# Patient Record
Sex: FEMALE | Race: WHITE | HISPANIC OR LATINO | Employment: UNEMPLOYED | URBAN - METROPOLITAN AREA
[De-identification: names, ages, dates, MRNs, and addresses within clinical notes are randomized per-mention and may not be internally consistent; named-entity substitution may affect disease eponyms.]

---

## 2024-02-14 ENCOUNTER — LAB VISIT (OUTPATIENT)
Dept: LAB | Facility: HOSPITAL | Age: 29
End: 2024-02-14
Attending: OBSTETRICS & GYNECOLOGY
Payer: MEDICAID

## 2024-02-14 DIAGNOSIS — Z34.82 ENCOUNTER FOR SUPERVISION OF NORMAL PREGNANCY IN MULTIGRAVIDA IN SECOND TRIMESTER: ICD-10-CM

## 2024-02-14 LAB — GLUCOSE 1H P 100 G GLC PO SERPL-MCNC: 145 MG/DL (ref 100–180)

## 2024-02-14 PROCEDURE — 36415 COLL VENOUS BLD VENIPUNCTURE: CPT

## 2024-02-14 PROCEDURE — 82950 GLUCOSE TEST: CPT

## 2024-02-16 ENCOUNTER — LAB VISIT (OUTPATIENT)
Dept: LAB | Facility: HOSPITAL | Age: 29
End: 2024-02-16
Attending: OBSTETRICS & GYNECOLOGY
Payer: MEDICAID

## 2024-02-16 DIAGNOSIS — Z34.83 ENCOUNTER FOR SUPERVISION OF NORMAL PREGNANCY IN MULTIGRAVIDA IN THIRD TRIMESTER: ICD-10-CM

## 2024-02-16 DIAGNOSIS — R73.09 ELEVATED GLUCOSE TOLERANCE TEST: ICD-10-CM

## 2024-02-16 LAB
GLUCOSE 1H P 100 G GLC PO SERPL-MCNC: 125 MG/DL (ref 100–180)
GLUCOSE 2H P 100 G GLC PO SERPL-MCNC: 123 MG/DL (ref 70–140)
GLUCOSE 3H P 100 G GLC PO SERPL-MCNC: 123 MG/DL (ref 70–115)
GLUCOSE P FAST SERPL-MCNC: 81 MG/DL (ref 70–100)
PATH REV: NORMAL

## 2024-02-16 PROCEDURE — 82947 ASSAY GLUCOSE BLOOD QUANT: CPT

## 2024-02-16 PROCEDURE — 82951 GLUCOSE TOLERANCE TEST (GTT): CPT

## 2024-02-16 PROCEDURE — 36415 COLL VENOUS BLD VENIPUNCTURE: CPT

## 2024-02-16 PROCEDURE — 82952 GTT-ADDED SAMPLES: CPT

## 2024-02-16 PROCEDURE — 82950 GLUCOSE TEST: CPT

## 2024-02-19 LAB — POCT GLUCOSE: 75 MG/DL (ref 70–110)

## 2024-02-28 ENCOUNTER — LAB VISIT (OUTPATIENT)
Dept: LAB | Facility: HOSPITAL | Age: 29
End: 2024-02-28
Attending: OBSTETRICS & GYNECOLOGY
Payer: MEDICAID

## 2024-02-28 DIAGNOSIS — Z34.83 ENCOUNTER FOR SUPERVISION OF NORMAL PREGNANCY IN MULTIGRAVIDA IN THIRD TRIMESTER: ICD-10-CM

## 2024-02-28 LAB
BASOPHILS # BLD AUTO: 0.03 X10(3)/MCL
BASOPHILS NFR BLD AUTO: 0.3 %
C TRACH DNA SPEC QL NAA+PROBE: NEGATIVE
EOSINOPHIL # BLD AUTO: 0.11 X10(3)/MCL (ref 0–0.9)
EOSINOPHIL NFR BLD AUTO: 1 %
ERYTHROCYTE [DISTWIDTH] IN BLOOD BY AUTOMATED COUNT: 14.6 % (ref 11.5–17)
HCT VFR BLD AUTO: 41.7 % (ref 37–47)
HGB BLD-MCNC: 13.3 G/DL (ref 12–16)
HIV 1+2 AB+HIV1 P24 AG SERPL QL IA: NONREACTIVE
IMM GRANULOCYTES # BLD AUTO: 0.12 X10(3)/MCL (ref 0–0.04)
IMM GRANULOCYTES NFR BLD AUTO: 1.1 %
LYMPHOCYTES # BLD AUTO: 1.51 X10(3)/MCL (ref 0.6–4.6)
LYMPHOCYTES NFR BLD AUTO: 14.2 %
MCH RBC QN AUTO: 28.7 PG (ref 27–31)
MCHC RBC AUTO-ENTMCNC: 31.9 G/DL (ref 33–36)
MCV RBC AUTO: 90.1 FL (ref 80–94)
MONOCYTES # BLD AUTO: 0.79 X10(3)/MCL (ref 0.1–1.3)
MONOCYTES NFR BLD AUTO: 7.4 %
N GONORRHOEA DNA SPEC QL NAA+PROBE: NEGATIVE
NEUTROPHILS # BLD AUTO: 8.08 X10(3)/MCL (ref 2.1–9.2)
NEUTROPHILS NFR BLD AUTO: 76 %
NRBC BLD AUTO-RTO: 0 %
PLATELET # BLD AUTO: 299 X10(3)/MCL (ref 130–400)
PMV BLD AUTO: 9.9 FL (ref 7.4–10.4)
PRENATAL STREP B CULTURE: NEGATIVE
RBC # BLD AUTO: 4.63 X10(6)/MCL (ref 4.2–5.4)
T PALLIDUM AB SER QL: NONREACTIVE
WBC # SPEC AUTO: 10.64 X10(3)/MCL (ref 4.5–11.5)

## 2024-02-28 PROCEDURE — 85025 COMPLETE CBC W/AUTO DIFF WBC: CPT

## 2024-02-28 PROCEDURE — 86780 TREPONEMA PALLIDUM: CPT

## 2024-02-28 PROCEDURE — 87389 HIV-1 AG W/HIV-1&-2 AB AG IA: CPT

## 2024-02-28 PROCEDURE — 36415 COLL VENOUS BLD VENIPUNCTURE: CPT

## 2024-03-18 ENCOUNTER — ANESTHESIA (OUTPATIENT)
Dept: OBSTETRICS AND GYNECOLOGY | Facility: HOSPITAL | Age: 29
End: 2024-03-18
Payer: MEDICAID

## 2024-03-18 ENCOUNTER — HOSPITAL ENCOUNTER (INPATIENT)
Facility: HOSPITAL | Age: 29
LOS: 3 days | Discharge: HOME | End: 2024-03-21
Attending: OBSTETRICS & GYNECOLOGY | Admitting: OBSTETRICS & GYNECOLOGY
Payer: MEDICAID

## 2024-03-18 ENCOUNTER — ANESTHESIA EVENT (OUTPATIENT)
Dept: OBSTETRICS AND GYNECOLOGY | Facility: HOSPITAL | Age: 29
End: 2024-03-18
Payer: MEDICAID

## 2024-03-18 DIAGNOSIS — R10.9 ABDOMINAL PAIN AFFECTING PREGNANCY: ICD-10-CM

## 2024-03-18 DIAGNOSIS — Z3A.38 38 WEEKS GESTATION OF PREGNANCY: Primary | ICD-10-CM

## 2024-03-18 DIAGNOSIS — O26.899 ABDOMINAL PAIN AFFECTING PREGNANCY: ICD-10-CM

## 2024-03-18 LAB
BASOPHILS # BLD AUTO: 0.03 X10(3)/MCL
BASOPHILS NFR BLD AUTO: 0.2 %
EOSINOPHIL # BLD AUTO: 0.09 X10(3)/MCL (ref 0–0.9)
EOSINOPHIL NFR BLD AUTO: 0.7 %
ERYTHROCYTE [DISTWIDTH] IN BLOOD BY AUTOMATED COUNT: 14.9 % (ref 11.5–17)
GROUP & RH: NORMAL
HCT VFR BLD AUTO: 37.3 % (ref 37–47)
HGB BLD-MCNC: 12.4 G/DL (ref 12–16)
IMM GRANULOCYTES # BLD AUTO: 0.05 X10(3)/MCL (ref 0–0.04)
IMM GRANULOCYTES NFR BLD AUTO: 0.4 %
INDIRECT COOMBS: NORMAL
LYMPHOCYTES # BLD AUTO: 2.51 X10(3)/MCL (ref 0.6–4.6)
LYMPHOCYTES NFR BLD AUTO: 19.5 %
MCH RBC QN AUTO: 29 PG (ref 27–31)
MCHC RBC AUTO-ENTMCNC: 33.2 G/DL (ref 33–36)
MCV RBC AUTO: 87.1 FL (ref 80–94)
MONOCYTES # BLD AUTO: 0.73 X10(3)/MCL (ref 0.1–1.3)
MONOCYTES NFR BLD AUTO: 5.7 %
NEUTROPHILS # BLD AUTO: 9.44 X10(3)/MCL (ref 2.1–9.2)
NEUTROPHILS NFR BLD AUTO: 73.5 %
NRBC BLD AUTO-RTO: 0 %
PLATELET # BLD AUTO: 268 X10(3)/MCL (ref 130–400)
PMV BLD AUTO: 10.8 FL (ref 7.4–10.4)
RBC # BLD AUTO: 4.28 X10(6)/MCL (ref 4.2–5.4)
SPECIMEN OUTDATE: NORMAL
T PALLIDUM AB SER QL: NONREACTIVE
WBC # SPEC AUTO: 12.85 X10(3)/MCL (ref 4.5–11.5)

## 2024-03-18 PROCEDURE — 99900059 HC C-SECTION ATTEND (STAT)

## 2024-03-18 PROCEDURE — 63600175 PHARM REV CODE 636 W HCPCS: Performed by: STUDENT IN AN ORGANIZED HEALTH CARE EDUCATION/TRAINING PROGRAM

## 2024-03-18 PROCEDURE — 10907ZC DRAINAGE OF AMNIOTIC FLUID, THERAPEUTIC FROM PRODUCTS OF CONCEPTION, VIA NATURAL OR ARTIFICIAL OPENING: ICD-10-PCS | Performed by: OBSTETRICS & GYNECOLOGY

## 2024-03-18 PROCEDURE — 63600175 PHARM REV CODE 636 W HCPCS: Mod: JZ,JG | Performed by: ANESTHESIOLOGY

## 2024-03-18 PROCEDURE — 36004724 HC OB OR TIME LEV III - 1ST 15 MIN: Performed by: OBSTETRICS & GYNECOLOGY

## 2024-03-18 PROCEDURE — 11000001 HC ACUTE MED/SURG PRIVATE ROOM

## 2024-03-18 PROCEDURE — 63600175 PHARM REV CODE 636 W HCPCS: Performed by: OBSTETRICS & GYNECOLOGY

## 2024-03-18 PROCEDURE — 25000003 PHARM REV CODE 250: Performed by: STUDENT IN AN ORGANIZED HEALTH CARE EDUCATION/TRAINING PROGRAM

## 2024-03-18 PROCEDURE — 99900035 HC TECH TIME PER 15 MIN (STAT)

## 2024-03-18 PROCEDURE — 59514 CESAREAN DELIVERY ONLY: CPT | Mod: QX,CRNA,, | Performed by: NURSE ANESTHETIST, CERTIFIED REGISTERED

## 2024-03-18 PROCEDURE — 99285 EMERGENCY DEPT VISIT HI MDM: CPT | Mod: 25

## 2024-03-18 PROCEDURE — 37000008 HC ANESTHESIA 1ST 15 MINUTES: Performed by: OBSTETRICS & GYNECOLOGY

## 2024-03-18 PROCEDURE — 71000033 HC RECOVERY, INTIAL HOUR: Performed by: OBSTETRICS & GYNECOLOGY

## 2024-03-18 PROCEDURE — 86780 TREPONEMA PALLIDUM: CPT | Performed by: OBSTETRICS & GYNECOLOGY

## 2024-03-18 PROCEDURE — 25000003 PHARM REV CODE 250: Performed by: OBSTETRICS & GYNECOLOGY

## 2024-03-18 PROCEDURE — 86850 RBC ANTIBODY SCREEN: CPT | Performed by: OBSTETRICS & GYNECOLOGY

## 2024-03-18 PROCEDURE — 37000009 HC ANESTHESIA EA ADD 15 MINS: Performed by: OBSTETRICS & GYNECOLOGY

## 2024-03-18 PROCEDURE — 27200918 HC ALEXIS O RING

## 2024-03-18 PROCEDURE — 85025 COMPLETE CBC W/AUTO DIFF WBC: CPT | Performed by: OBSTETRICS & GYNECOLOGY

## 2024-03-18 PROCEDURE — 59514 CESAREAN DELIVERY ONLY: CPT | Mod: QY,ANES,, | Performed by: ANESTHESIOLOGY

## 2024-03-18 PROCEDURE — 36004725 HC OB OR TIME LEV III - EA ADD 15 MIN: Performed by: OBSTETRICS & GYNECOLOGY

## 2024-03-18 PROCEDURE — 51702 INSERT TEMP BLADDER CATH: CPT

## 2024-03-18 PROCEDURE — 27000492 HC SLEEVE, SCD T/L

## 2024-03-18 RX ORDER — SODIUM CITRATE AND CITRIC ACID MONOHYDRATE 334; 500 MG/5ML; MG/5ML
30 SOLUTION ORAL ONCE
Status: CANCELLED | OUTPATIENT
Start: 2024-03-18 | End: 2024-03-18

## 2024-03-18 RX ORDER — OXYTOCIN/RINGER'S LACTATE 30/500 ML
334 PLASTIC BAG, INJECTION (ML) INTRAVENOUS ONCE AS NEEDED
Status: DISCONTINUED | OUTPATIENT
Start: 2024-03-18 | End: 2024-03-21 | Stop reason: HOSPADM

## 2024-03-18 RX ORDER — ONDANSETRON 4 MG/1
8 TABLET, ORALLY DISINTEGRATING ORAL EVERY 8 HOURS PRN
Status: DISCONTINUED | OUTPATIENT
Start: 2024-03-18 | End: 2024-03-21 | Stop reason: HOSPADM

## 2024-03-18 RX ORDER — OXYCODONE AND ACETAMINOPHEN 10; 325 MG/1; MG/1
1 TABLET ORAL EVERY 4 HOURS PRN
Status: DISCONTINUED | OUTPATIENT
Start: 2024-03-18 | End: 2024-03-21

## 2024-03-18 RX ORDER — OXYTOCIN/RINGER'S LACTATE 30/500 ML
95 PLASTIC BAG, INJECTION (ML) INTRAVENOUS ONCE
Status: DISCONTINUED | OUTPATIENT
Start: 2024-03-18 | End: 2024-03-21 | Stop reason: HOSPADM

## 2024-03-18 RX ORDER — IBUPROFEN 800 MG/1
800 TABLET ORAL EVERY 8 HOURS
Status: DISCONTINUED | OUTPATIENT
Start: 2024-03-19 | End: 2024-03-21 | Stop reason: HOSPADM

## 2024-03-18 RX ORDER — MISOPROSTOL 100 UG/1
800 TABLET ORAL ONCE AS NEEDED
Status: COMPLETED | OUTPATIENT
Start: 2024-03-18 | End: 2024-03-18

## 2024-03-18 RX ORDER — ADHESIVE BANDAGE
30 BANDAGE TOPICAL 2 TIMES DAILY PRN
Status: DISCONTINUED | OUTPATIENT
Start: 2024-03-19 | End: 2024-03-21 | Stop reason: HOSPADM

## 2024-03-18 RX ORDER — SODIUM CHLORIDE, SODIUM LACTATE, POTASSIUM CHLORIDE, CALCIUM CHLORIDE 600; 310; 30; 20 MG/100ML; MG/100ML; MG/100ML; MG/100ML
INJECTION, SOLUTION INTRAVENOUS CONTINUOUS
Status: DISCONTINUED | OUTPATIENT
Start: 2024-03-18 | End: 2024-03-21 | Stop reason: HOSPADM

## 2024-03-18 RX ORDER — METHYLERGONOVINE MALEATE 0.2 MG/ML
200 INJECTION INTRAVENOUS ONCE AS NEEDED
Status: DISCONTINUED | OUTPATIENT
Start: 2024-03-18 | End: 2024-03-21 | Stop reason: HOSPADM

## 2024-03-18 RX ORDER — DIPHENHYDRAMINE HCL 25 MG
25 CAPSULE ORAL EVERY 4 HOURS PRN
Status: DISCONTINUED | OUTPATIENT
Start: 2024-03-18 | End: 2024-03-21 | Stop reason: HOSPADM

## 2024-03-18 RX ORDER — DIPHENOXYLATE HYDROCHLORIDE AND ATROPINE SULFATE 2.5; .025 MG/1; MG/1
2 TABLET ORAL EVERY 6 HOURS PRN
Status: DISCONTINUED | OUTPATIENT
Start: 2024-03-18 | End: 2024-03-21 | Stop reason: HOSPADM

## 2024-03-18 RX ORDER — SIMETHICONE 80 MG
1 TABLET,CHEWABLE ORAL EVERY 6 HOURS PRN
Status: DISCONTINUED | OUTPATIENT
Start: 2024-03-18 | End: 2024-03-21 | Stop reason: HOSPADM

## 2024-03-18 RX ORDER — EPHEDRINE SULFATE 50 MG/ML
10 INJECTION, SOLUTION INTRAVENOUS
Status: CANCELLED | OUTPATIENT
Start: 2024-03-18 | End: 2024-03-18

## 2024-03-18 RX ORDER — HYDROMORPHONE HYDROCHLORIDE 2 MG/ML
1 INJECTION, SOLUTION INTRAMUSCULAR; INTRAVENOUS; SUBCUTANEOUS EVERY 4 HOURS PRN
Status: DISCONTINUED | OUTPATIENT
Start: 2024-03-18 | End: 2024-03-21 | Stop reason: HOSPADM

## 2024-03-18 RX ORDER — OXYTOCIN 10 [USP'U]/ML
10 INJECTION, SOLUTION INTRAMUSCULAR; INTRAVENOUS ONCE AS NEEDED
Status: DISCONTINUED | OUTPATIENT
Start: 2024-03-18 | End: 2024-03-21 | Stop reason: HOSPADM

## 2024-03-18 RX ORDER — BISACODYL 10 MG/1
10 SUPPOSITORY RECTAL ONCE AS NEEDED
Status: DISCONTINUED | OUTPATIENT
Start: 2024-03-18 | End: 2024-03-21 | Stop reason: HOSPADM

## 2024-03-18 RX ORDER — ACETAMINOPHEN 10 MG/ML
INJECTION, SOLUTION INTRAVENOUS
Status: DISCONTINUED | OUTPATIENT
Start: 2024-03-18 | End: 2024-03-18

## 2024-03-18 RX ORDER — FAMOTIDINE 10 MG/ML
20 INJECTION INTRAVENOUS
Status: DISCONTINUED | OUTPATIENT
Start: 2024-03-18 | End: 2024-03-21 | Stop reason: HOSPADM

## 2024-03-18 RX ORDER — DOCUSATE SODIUM 100 MG/1
200 CAPSULE, LIQUID FILLED ORAL 2 TIMES DAILY
Status: DISCONTINUED | OUTPATIENT
Start: 2024-03-18 | End: 2024-03-21 | Stop reason: HOSPADM

## 2024-03-18 RX ORDER — CARBOPROST TROMETHAMINE 250 UG/ML
250 INJECTION, SOLUTION INTRAMUSCULAR
Status: DISCONTINUED | OUTPATIENT
Start: 2024-03-18 | End: 2024-03-21 | Stop reason: HOSPADM

## 2024-03-18 RX ORDER — METHYLERGONOVINE MALEATE 0.2 MG/ML
200 INJECTION INTRAVENOUS
Status: DISCONTINUED | OUTPATIENT
Start: 2024-03-18 | End: 2024-03-21 | Stop reason: HOSPADM

## 2024-03-18 RX ORDER — FENTANYL CITRATE 50 UG/ML
INJECTION, SOLUTION INTRAMUSCULAR; INTRAVENOUS
Status: DISCONTINUED | OUTPATIENT
Start: 2024-03-18 | End: 2024-03-18

## 2024-03-18 RX ORDER — OXYTOCIN/RINGER'S LACTATE 30/500 ML
95 PLASTIC BAG, INJECTION (ML) INTRAVENOUS ONCE
Status: COMPLETED | OUTPATIENT
Start: 2024-03-18 | End: 2024-03-18

## 2024-03-18 RX ORDER — PHENYLEPHRINE HCL IN 0.9% NACL 1 MG/10 ML
SYRINGE (ML) INTRAVENOUS
Status: DISCONTINUED | OUTPATIENT
Start: 2024-03-18 | End: 2024-03-18

## 2024-03-18 RX ORDER — MUPIROCIN 20 MG/G
OINTMENT TOPICAL
Status: DISCONTINUED | OUTPATIENT
Start: 2024-03-18 | End: 2024-03-21 | Stop reason: HOSPADM

## 2024-03-18 RX ORDER — SODIUM CHLORIDE 0.9 % (FLUSH) 0.9 %
10 SYRINGE (ML) INJECTION
Status: DISCONTINUED | OUTPATIENT
Start: 2024-03-18 | End: 2024-03-21 | Stop reason: HOSPADM

## 2024-03-18 RX ORDER — CEFAZOLIN SODIUM 1 G/3ML
INJECTION, POWDER, FOR SOLUTION INTRAMUSCULAR; INTRAVENOUS
Status: DISCONTINUED | OUTPATIENT
Start: 2024-03-18 | End: 2024-03-18

## 2024-03-18 RX ORDER — SODIUM CITRATE AND CITRIC ACID MONOHYDRATE 334; 500 MG/5ML; MG/5ML
30 SOLUTION ORAL
Status: DISCONTINUED | OUTPATIENT
Start: 2024-03-18 | End: 2024-03-21 | Stop reason: HOSPADM

## 2024-03-18 RX ORDER — FENTANYL/BUPIVACAINE/NS/PF 2-1250MCG
PLASTIC BAG, INJECTION (ML) INJECTION CONTINUOUS
Status: DISCONTINUED | OUTPATIENT
Start: 2024-03-18 | End: 2024-03-18

## 2024-03-18 RX ORDER — OXYTOCIN/RINGER'S LACTATE 30/500 ML
95 PLASTIC BAG, INJECTION (ML) INTRAVENOUS ONCE AS NEEDED
Status: DISCONTINUED | OUTPATIENT
Start: 2024-03-18 | End: 2024-03-21 | Stop reason: HOSPADM

## 2024-03-18 RX ORDER — PRENATAL WITH FERROUS FUM AND FOLIC ACID 3080; 920; 120; 400; 22; 1.84; 3; 20; 10; 1; 12; 200; 27; 25; 2 [IU]/1; [IU]/1; MG/1; [IU]/1; MG/1; MG/1; MG/1; MG/1; MG/1; MG/1; UG/1; MG/1; MG/1; MG/1; MG/1
1 TABLET ORAL DAILY
Status: DISCONTINUED | OUTPATIENT
Start: 2024-03-19 | End: 2024-03-21 | Stop reason: HOSPADM

## 2024-03-18 RX ORDER — MUPIROCIN 20 MG/G
OINTMENT TOPICAL 2 TIMES DAILY
Status: DISCONTINUED | OUTPATIENT
Start: 2024-03-18 | End: 2024-03-21 | Stop reason: HOSPADM

## 2024-03-18 RX ORDER — MISOPROSTOL 100 UG/1
800 TABLET ORAL ONCE AS NEEDED
Status: DISCONTINUED | OUTPATIENT
Start: 2024-03-18 | End: 2024-03-21 | Stop reason: HOSPADM

## 2024-03-18 RX ORDER — OXYTOCIN/RINGER'S LACTATE 30/500 ML
PLASTIC BAG, INJECTION (ML) INTRAVENOUS
Status: DISCONTINUED | OUTPATIENT
Start: 2024-03-18 | End: 2024-03-18

## 2024-03-18 RX ORDER — MEPERIDINE HYDROCHLORIDE 25 MG/ML
25 INJECTION INTRAMUSCULAR; INTRAVENOUS; SUBCUTANEOUS EVERY 4 HOURS PRN
Status: ACTIVE | OUTPATIENT
Start: 2024-03-18 | End: 2024-03-19

## 2024-03-18 RX ORDER — NAPROXEN SODIUM 220 MG/1
81 TABLET, FILM COATED ORAL DAILY
COMMUNITY

## 2024-03-18 RX ORDER — AMOXICILLIN 250 MG
1 CAPSULE ORAL NIGHTLY PRN
Status: DISCONTINUED | OUTPATIENT
Start: 2024-03-18 | End: 2024-03-21 | Stop reason: HOSPADM

## 2024-03-18 RX ORDER — MISOPROSTOL 100 UG/1
800 TABLET ORAL
Status: DISCONTINUED | OUTPATIENT
Start: 2024-03-18 | End: 2024-03-21 | Stop reason: HOSPADM

## 2024-03-18 RX ORDER — PROMETHAZINE HYDROCHLORIDE 25 MG/ML
12.5 INJECTION, SOLUTION INTRAMUSCULAR; INTRAVENOUS EVERY 4 HOURS PRN
Status: DISCONTINUED | OUTPATIENT
Start: 2024-03-18 | End: 2024-03-21 | Stop reason: HOSPADM

## 2024-03-18 RX ORDER — ONDANSETRON HYDROCHLORIDE 2 MG/ML
INJECTION, SOLUTION INTRAVENOUS
Status: DISCONTINUED | OUTPATIENT
Start: 2024-03-18 | End: 2024-03-18

## 2024-03-18 RX ORDER — BUPIVACAINE HYDROCHLORIDE 7.5 MG/ML
INJECTION, SOLUTION EPIDURAL; RETROBULBAR
Status: COMPLETED | OUTPATIENT
Start: 2024-03-18 | End: 2024-03-18

## 2024-03-18 RX ORDER — OXYTOCIN/RINGER'S LACTATE 30/500 ML
334 PLASTIC BAG, INJECTION (ML) INTRAVENOUS ONCE
Status: DISCONTINUED | OUTPATIENT
Start: 2024-03-18 | End: 2024-03-21 | Stop reason: HOSPADM

## 2024-03-18 RX ORDER — MORPHINE SULFATE 0.5 MG/ML
INJECTION, SOLUTION EPIDURAL; INTRATHECAL; INTRAVENOUS
Status: DISCONTINUED | OUTPATIENT
Start: 2024-03-18 | End: 2024-03-18

## 2024-03-18 RX ORDER — KETOROLAC TROMETHAMINE 30 MG/ML
30 INJECTION, SOLUTION INTRAMUSCULAR; INTRAVENOUS EVERY 8 HOURS
Status: COMPLETED | OUTPATIENT
Start: 2024-03-18 | End: 2024-03-19

## 2024-03-18 RX ADMIN — CEFAZOLIN 2 G: 330 INJECTION, POWDER, FOR SOLUTION INTRAMUSCULAR; INTRAVENOUS at 05:03

## 2024-03-18 RX ADMIN — DOCUSATE SODIUM 200 MG: 100 CAPSULE, LIQUID FILLED ORAL at 08:03

## 2024-03-18 RX ADMIN — KETOROLAC TROMETHAMINE 30 MG: 30 INJECTION, SOLUTION INTRAMUSCULAR at 09:03

## 2024-03-18 RX ADMIN — SODIUM CHLORIDE, POTASSIUM CHLORIDE, SODIUM LACTATE AND CALCIUM CHLORIDE: 600; 310; 30; 20 INJECTION, SOLUTION INTRAVENOUS at 05:03

## 2024-03-18 RX ADMIN — BUPIVACAINE HYDROCHLORIDE 1.6 ML: 7.5 INJECTION, SOLUTION EPIDURAL; RETROBULBAR at 06:03

## 2024-03-18 RX ADMIN — MISOPROSTOL 800 MCG: 100 TABLET ORAL at 09:03

## 2024-03-18 RX ADMIN — Medication 30 UNITS: at 06:03

## 2024-03-18 RX ADMIN — Medication 95 MILLI-UNITS/MIN: at 06:03

## 2024-03-18 RX ADMIN — SODIUM CHLORIDE, POTASSIUM CHLORIDE, SODIUM LACTATE AND CALCIUM CHLORIDE 1000 ML: 600; 310; 30; 20 INJECTION, SOLUTION INTRAVENOUS at 05:03

## 2024-03-18 RX ADMIN — SODIUM CITRATE AND CITRIC ACID MONOHYDRATE 30 ML: 500; 334 SOLUTION ORAL at 05:03

## 2024-03-18 RX ADMIN — MORPHINE SULFATE 0.1 MG: 0.5 INJECTION, SOLUTION EPIDURAL; INTRATHECAL; INTRAVENOUS at 05:03

## 2024-03-18 RX ADMIN — ACETAMINOPHEN 1000 MG: 10 INJECTION, SOLUTION INTRAVENOUS at 06:03

## 2024-03-18 RX ADMIN — Medication 50 MCG: at 05:03

## 2024-03-18 RX ADMIN — ONDANSETRON 4 MG: 2 INJECTION INTRAMUSCULAR; INTRAVENOUS at 05:03

## 2024-03-18 RX ADMIN — FENTANYL CITRATE 10 MCG: 50 INJECTION, SOLUTION INTRAMUSCULAR; INTRAVENOUS at 05:03

## 2024-03-18 RX ADMIN — SODIUM CHLORIDE, POTASSIUM CHLORIDE, SODIUM LACTATE AND CALCIUM CHLORIDE: 600; 310; 30; 20 INJECTION, SOLUTION INTRAVENOUS at 08:03

## 2024-03-18 NOTE — ANESTHESIA PROCEDURE NOTES
Spinal    Diagnosis: for surgery  Patient location during procedure: OR  Start time: 3/18/2024 5:36 PM  Timeout: 3/18/2024 5:36 PM  End time: 3/18/2024 5:41 PM    Staffing  Authorizing Provider: Israel Medrano MD  Performing Provider: Israel Medrano MD    Staffing  Performed by: Israel Medrano MD  Authorized by: Israel Medrano MD    Preanesthetic Checklist  Completed: patient identified, IV checked, site marked, risks and benefits discussed, surgical consent, monitors and equipment checked, pre-op evaluation and timeout performed  Spinal Block  Patient position: sitting  Prep: ChloraPrep and Mask worn, hand hygeine performed, sterile gloves worn  Patient monitoring: heart rate, continuous pulse ox and frequent blood pressure checks  Approach: midline  Location: L3-4  Injection technique: single shot  CSF Fluid: clear free-flowing CSF  Needle  Needle type: pencil-tip (20G 1.25 inch introducer needle used to facilitate passage of spinal needle)   Needle gauge: 25 G  Needle length: 3.5 in  Additional Documentation: negative aspiration for heme, no paresthesia on injection and incremental injection  Needle localization: anatomical landmarks  Assessment  Sensory level: T4   Dermatomal levels determined by alcohol wipe  Ease of block: easy  Patient's tolerance of the procedure: comfortable throughout block (Sterile dressing applied to skin puncture site, pt returned to supine position)  Additional Notes  ANDRA applied as needed.  Fentanyl 10 mcg & Duramorph 150 mcg added to spinal.  Medications:    Medications: bupivacaine (pf) (MARCAINE) injection 0.75% - Intraspinal   1.6 mL - 3/18/2024 6:08:00 PM

## 2024-03-18 NOTE — ED PROVIDER NOTES
LEONEL NOTE     Admit Date: 3/18/2024  LEON[facility] Physician: Todd Ha  Primary OBGYN: Dr. Aldo Fleming    Admit Diagnosis/Chief Complaint: Contractions      Chief Complaint   Patient presents with    Abdominal Pain     Pt. States abdominal cramps started at 0100am . Rating pain a 7.5/10       Hospital Course:  Leatha Infante is a 28 y.o.  at 38w6d presents complaining of contractions  This IUP is complicated by previous C/S    Patient denies vaginal bleeding, leakage of fluid, headache, vision changes, RUQ pain, dysuria, fever, and nausea/vomiting.  Fetal Movement: normal.    /87   Pulse 72   Temp 98.5 °F (36.9 °C) (Oral)   Resp 16   LMP 2023   Temp:  [98.5 °F (36.9 °C)] 98.5 °F (36.9 °C)  Pulse:  [72] 72  Resp:  [16] 16  BP: (129)/(87) 129/87    General: in no apparent distress well developed and well nourished non-toxic in no respiratory distress and acyanotic  Abdominal: soft, nontender, nondistended, no abnormal masses, no epigastric pain FHT present  Back: lumbar tenderness absent   CVA tenderness none  Extremeties no redness or tenderness in the calves or thighs no edema    SSE:   SVE:SVE (PeriWATCH)  Dilation (cm): 1  Effacement (%): 60  Station: -3  Simplified Blas Score: 3     FHT:  minimal variabilty, getting BPP,   TOCO: Contractions every 5-8 min      LABS:   No results found for this or any previous visit (from the past 24 hour(s)).  [unfilled]     Imaging Results              US OB 14+ Weeks TransAbd, w/Biophysical Profile, w/o NST, Single Gestation (xpd) (In process)                      ASSESMENT: Leatha Infante is a 28 y.o.   at 38w6d with contractions, previous C/S  Observation in LEONEL Will Rule out labor.  Minimal variability for first 20 minutes on monitor so BPP ordered.  Will FU results.  Will give full 2 hours to rule out labor          Discharge Diagnosis/Clinical Impression**:  contractions  Status:Stable    Patient Instructions:       - Pt was given routine pregnancy instructions including to return to triage if she had any vaginal bleeding (other than spotting for the next 48hrs), any loss of fluid like her water broke, decreased fetal movement, or contractions Q 5min lasting for 2 or more hours. Pt was also instructed to drink copious water. Patient voiced understanding of all these instructions and was subsequently discharged home.    She will follow up with her primary OB.      This note was created with the assistance of Globial voice recognition software. There may be transcription errors as a result of using this technology however minimal. Effort has been made to assure accuracy of transcription but any obvious errors or omissions should be clarified with the author of the document.

## 2024-03-18 NOTE — H&P
[facility]                       [address] [address]    PATIENT NAME:       NORMA HDEZ  YOB: 1995  CSN:                [id number]   MRN:                [id number]  ADMIT DATE:         2024 04:41:00  PHYSICIAN:          Aldo Fleming MD                        HISTORY AND PHYSICAL      Ms. Plascencia was seen at the bedside this morning OB ED 1.  She has a desire to   undergo trial of labor after  section.  She has been here in the OB ED   since 4:00 am with no cervical change.  However, she is juan every 3-6   minutes and she ranks them a 10/10 with.  She will be admitted to the   hospital for observation and a recheck in 4-6 hours.  I explained to Ms Plascencia and her  that if she makes no cervical change with this   exquisite pain that she is having, if it does not resolve, my recommendation   will be to perform  section.  They verbalized understanding and are in   agreement.    OBSTETRICAL HISTORY:  Notable for 1 miscarriage, 1  section in 2019 at   37 weeks for non-reassuring fetal status by another physician.    ALLERGIES:  SHE HAS NO KNOWN DRUG ALLERGIES.     GYNECOLOGICAL HISTORY:  She denies.    FAMILY HISTORY:  She denies.    SOCIAL HISTORY:  She denies.    MEDICINES:  Prenatal vitamins.    REVIEW OF SYSTEMS:  She states her contractions have gotten progressively worse since yesterday.    She denies bleeding, leakage of fluid.  Reports good fetal movement.  Denies   fevers, chills, sick contacts.    PHYSICAL EXAMINATION:  VITAL SIGNS:  Her vital signs are stable.  She is afebrile.  GENERAL:  She is alert, oriented x3.  ABDOMEN:  Gravid.  Estimated fetal weight is 7-1/2 to 8 pounds.  PELVIC:  She is   160, -3.  EXTREMITIES:  Nontender.    LABORATORIES:  Pending.    NST is category 1 at this time.  She is juan. We will  admit her.        ______________________________  MD NILESH Law/JENELLE  DD:  03/18/2024  Time:  08:24AM  DT:  03/18/2024  Time:  09:09AM  Job #:  [id number]      HISTORY AND PHYSICAL

## 2024-03-18 NOTE — TRANSFER OF CARE
"Anesthesia Transfer of Care Note    Patient: Leatha ChiazSusy    Procedure(s) Performed: Procedure(s) (LRB):   SECTION (N/A)    Patient location: Labor and Delivery    Anesthesia Type: spinal    Transport from OR: Transported from OR on room air with adequate spontaneous ventilation    Post pain: adequate analgesia    Post assessment: no apparent anesthetic complications and tolerated procedure well    Post vital signs: stable    Level of consciousness: awake and alert    Nausea/Vomiting: no nausea/vomiting    Complications: none    Transfer of care protocol was followed      Last vitals: Visit Vitals  /66 (BP Location: Right arm, Patient Position: Sitting)   Pulse 78   Temp 37 °C (98.6 °F) (Oral)   Resp 16   Ht 5' 1" (1.549 m)   Wt 72.6 kg (160 lb)   LMP 2023   SpO2 99%   Breastfeeding No   BMI 30.23 kg/m²     "

## 2024-03-18 NOTE — ANESTHESIA PREPROCEDURE EVALUATION
"                                                                                                             2024  Leatha Infante is a 28 y.o., female.  To OR for .      Pre-op Assessment    I have reviewed the Patient Summary Reports.     I have reviewed the Nursing Notes. I have reviewed the NPO Status.   I have reviewed the Medications.     Review of Systems  Cardiovascular:  Exercise tolerance: good                                               Physical Exam  General: Well nourished and Cooperative    Airway:  Mallampati: II   Mouth Opening: Normal  TM Distance: Normal  Tongue: Normal  Neck ROM: Normal ROM    Chest/Lungs:  Clear to auscultation    Heart:  Rhythm: Regular Rhythm        Anesthesia Plan  Type of Anesthesia, risks & benefits discussed:    Anesthesia Type: Spinal  Intra-op Monitoring Plan: Standard ASA Monitors  Post Op Pain Control Plan: multimodal analgesia  Informed Consent: Informed consent signed with the Patient and all parties understand the risks and agree with anesthesia plan.  All questions answered.   ASA Score: 2  Day of Surgery Review of History & Physical: H&P Update referred to the surgeon/provider.    Ready For Surgery From Anesthesia Perspective.     .  I explained anesthesia plan to patient/responsbile party if available.  Anesthesia consent done going over the material facts, risks, complications & alternatives, obtained which includes the possibility of altering the anesthesia plan.  I reviewed problem list, prior to admission medication list, appropriate labs, any workup, Xray, EKG etc noted below.  Patients condition is satisfactory to proceed with anesthesia plan unless otherwise noted (see anesthesia chart for details of the anesthesia plan carried out).      Pre-operative evaluation for Procedure(s) (LRB):   SECTION (N/A)    /81   Pulse 75   Temp 37.1 °C (98.7 °F)   Resp 16   Ht 5' 1" (1.549 m)   Wt 72.6 kg (160 lb)   LMP " 2023   SpO2 97%   Breastfeeding No   BMI 30.23 kg/m²     There is no problem list on file for this patient.      Review of patient's allergies indicates:  No Known Allergies    Current Outpatient Medications   Medication Instructions    aspirin 81 mg, Oral, Daily    ferrous sulfate 325 mg, Oral, Daily    prenatal vit/iron fum/folic ac (PRENATAL 1+1 ORAL) 1 tablet, Oral, Daily       Past Surgical History:   Procedure Laterality Date     SECTION N/A 2019    CHOLECYSTECTOMY         Social History     Socioeconomic History    Marital status:    Tobacco Use    Smoking status: Never    Smokeless tobacco: Never   Substance and Sexual Activity    Alcohol use: Not Currently    Drug use: Not Currently       Lab Results   Component Value Date    WBC 12.85 (H) 2024    HGB 12.4 2024    HCT 37.3 2024    MCV 87.1 2024     2024          BMP  Lab Results   Component Value Date    HCT 37.3 2024            \

## 2024-03-19 ENCOUNTER — ANESTHESIA EVENT (OUTPATIENT)
Dept: OBSTETRICS AND GYNECOLOGY | Facility: HOSPITAL | Age: 29
End: 2024-03-19
Payer: MEDICAID

## 2024-03-19 ENCOUNTER — ANESTHESIA (OUTPATIENT)
Dept: OBSTETRICS AND GYNECOLOGY | Facility: HOSPITAL | Age: 29
End: 2024-03-19
Payer: MEDICAID

## 2024-03-19 LAB
BASOPHILS # BLD AUTO: 0.06 X10(3)/MCL
BASOPHILS NFR BLD AUTO: 0.4 %
EOSINOPHIL # BLD AUTO: 0.1 X10(3)/MCL (ref 0–0.9)
EOSINOPHIL NFR BLD AUTO: 0.6 %
ERYTHROCYTE [DISTWIDTH] IN BLOOD BY AUTOMATED COUNT: 14.9 % (ref 11.5–17)
HCT VFR BLD AUTO: 34.9 % (ref 37–47)
HGB BLD-MCNC: 11.2 G/DL (ref 12–16)
IMM GRANULOCYTES # BLD AUTO: 0.06 X10(3)/MCL (ref 0–0.04)
IMM GRANULOCYTES NFR BLD AUTO: 0.4 %
LYMPHOCYTES # BLD AUTO: 2.75 X10(3)/MCL (ref 0.6–4.6)
LYMPHOCYTES NFR BLD AUTO: 17.3 %
MCH RBC QN AUTO: 28.6 PG (ref 27–31)
MCHC RBC AUTO-ENTMCNC: 32.1 G/DL (ref 33–36)
MCV RBC AUTO: 89 FL (ref 80–94)
MONOCYTES # BLD AUTO: 0.96 X10(3)/MCL (ref 0.1–1.3)
MONOCYTES NFR BLD AUTO: 6 %
NEUTROPHILS # BLD AUTO: 11.94 X10(3)/MCL (ref 2.1–9.2)
NEUTROPHILS NFR BLD AUTO: 75.3 %
NRBC BLD AUTO-RTO: 0 %
PLATELET # BLD AUTO: 230 X10(3)/MCL (ref 130–400)
PMV BLD AUTO: 10.5 FL (ref 7.4–10.4)
RBC # BLD AUTO: 3.92 X10(6)/MCL (ref 4.2–5.4)
WBC # SPEC AUTO: 15.87 X10(3)/MCL (ref 4.5–11.5)

## 2024-03-19 PROCEDURE — 25000003 PHARM REV CODE 250: Performed by: OBSTETRICS & GYNECOLOGY

## 2024-03-19 PROCEDURE — 11000001 HC ACUTE MED/SURG PRIVATE ROOM

## 2024-03-19 PROCEDURE — 85025 COMPLETE CBC W/AUTO DIFF WBC: CPT | Performed by: OBSTETRICS & GYNECOLOGY

## 2024-03-19 PROCEDURE — 63600175 PHARM REV CODE 636 W HCPCS: Performed by: OBSTETRICS & GYNECOLOGY

## 2024-03-19 RX ADMIN — OXYCODONE AND ACETAMINOPHEN 1 TABLET: 10; 325 TABLET ORAL at 12:03

## 2024-03-19 RX ADMIN — OXYCODONE AND ACETAMINOPHEN 1 TABLET: 10; 325 TABLET ORAL at 08:03

## 2024-03-19 RX ADMIN — PRENATAL VITAMINS-IRON FUMARATE 27 MG IRON-FOLIC ACID 0.8 MG TABLET 1 TABLET: at 08:03

## 2024-03-19 RX ADMIN — OXYCODONE AND ACETAMINOPHEN 1 TABLET: 10; 325 TABLET ORAL at 04:03

## 2024-03-19 RX ADMIN — KETOROLAC TROMETHAMINE 30 MG: 30 INJECTION, SOLUTION INTRAMUSCULAR at 02:03

## 2024-03-19 RX ADMIN — IBUPROFEN 800 MG: 800 TABLET, FILM COATED ORAL at 09:03

## 2024-03-19 RX ADMIN — KETOROLAC TROMETHAMINE 30 MG: 30 INJECTION, SOLUTION INTRAMUSCULAR at 05:03

## 2024-03-19 RX ADMIN — DOCUSATE SODIUM 200 MG: 100 CAPSULE, LIQUID FILLED ORAL at 09:03

## 2024-03-19 RX ADMIN — DOCUSATE SODIUM 200 MG: 100 CAPSULE, LIQUID FILLED ORAL at 08:03

## 2024-03-19 NOTE — PROGRESS NOTES
[facility]                       [address] [address]    PATIENT NAME:       NORMA HDEZ  YOB: 1995  CSN:                [id number]   MRN:                [id number]  ADMIT DATE:         2024 04:41:00  PHYSICIAN:          Aldo Fleming MD                            PROGRESS NOTE    DATE:      Ms. Hdez was examined this afternoon after 12 hours of arriving at   [facility].  She has made 0 cervical change and continues to have some   contractions.  Some contractions have elicited late placed decelerations.    Otherwise, the fetal heart rate tracing is category 1.  I explained to Ms. Mcgowan at 38 weeks and 6 days that my recommendation with her continued pain is   to go ahead and perform  section.  I gave her the option of continuing   along this path of trying to have a trial of labor after , and went over   the risks, benefits, and alternatives.  After discussion with her and her   , they would like to have a  section.  They understand that    section is a major surgery with major risk involved.  Our conversation   included, but was not limited to the risk of bleeding, infection, anesthesia,   the need for future surgeries, prolonged recovery.  They verbalized   understanding.  Consents will be signed.  We have notified the nursing team and   we will proceed.        ______________________________  Aldo Fleming MD    EPE/AQS  DD:  2024  Time:  04:41PM  DT:  2024  Time:  07:08PM  Job #:  [id number]      PROGRESS NOTE

## 2024-03-19 NOTE — PLAN OF CARE
Problem: Breastfeeding  Goal: Effective Breastfeeding  Intervention: Promote Effective Breastfeeding  Flowsheets (Taken 3/18/2024 2240)  Breastfeeding Assistance:   feeding session observed   infant latch-on verified   infant suck/swallow verified   support offered  Parent/Child Attachment Promotion:   positive reinforcement provided   strengths emphasized  Intervention: Support Exclusive Breastfeeding Success  Flowsheets (Taken 3/18/2024 2240)  Supportive Measures:   active listening utilized   counseling provided   verbalization of feelings encouraged  Breastfeeding Support:   encouragement provided   diary/feeding log utilized   lactation counseling provided

## 2024-03-19 NOTE — OP NOTE
[facility]                       [address]                      [place] [address]    PATIENT NAME:      NORMA HDEZ  YOB: 1995  CSN:               [id number]  MRN:               [id number]  ADMIT DATE:        2024 04:41:00  PHYSICIAN:         Aldo Fleming MD                          OPERATIVE REPORT      DATE OF SURGERY:    2024 10:43:05    SURGEON:  Aldo Fleming MD    PREOPERATIVE DIAGNOSIS:  A 28-year-old,  2, para 1, 38 weeks 6 days,   repeat , in labor, failed trial of labor after .    POSTOPERATIVE DIAGNOSIS:  A 28-year-old,  2, para 1, 38 weeks 6 days,   repeat , in labor, failed trial of labor after .    OPERATION:  Repeat low transverse  section.    FIRST ASSISTANT:  Wellington Fleming MD.    ANESTHESIOLOGIST:  Dr. Medrano.    ANESTHESIA:  Spinal.    BLOOD LOSS:  Average.    FINDINGS:  Viable female infant, 7 pounds 2 ounces.  Normal adnexa bilaterally.    Freely mobile contents.  Uteroabdominal wall adhesion.    ANTIBIOTICS:  Ancef 2 g.    SCDs were placed.  David catheter with clear urine.    DESCRIPTION OF PROCEDURE:  The risks, benefits, and alternatives to this   procedure were explained in detail to the patient.  She verbalized   understanding.  Consents were signed.  She was taken to the operating theater   with intravenous fluids running, placed on the table in dorsal supine position   where spinal anesthesia found to be adequate.  She was prepped and draped in a   sterile fashion.  Pfannenstiel skin incision was made 2 fingerbreadths above   symphysis pubis through an old Pfannenstiel scar line and carried down to   underlying layer of fascia sharply.  Fascia scored in the midline.  Fascial   incision extended bilaterally sharply.  Superior aspect of fascial incision   grasped with Nikolesner clamps x2 and underlying rectus  muscle dissected off   sharply.  Inferior aspect of fascial incision grasped with [facility] clamps x2 and   underlying rectus muscle dissected off sharply.  Rectus  in midline.    A lower blade was inserted.  Vesicouterine peritoneum identified, tented up,   entered sharply, bladder flap created digitally.  Of note, paper-thin lower   uterine segment  transverse uterine incision made.  Clear fluid noted.    Infant's head delivered.  Nose and mouth suctioned.  Rest of baby delivered   without difficulty.  Cord doubly clamped and cut.  Baby handed off to waiting   pediatric team.  Placenta delivered manually.  Uterus was left in situ, cleared   of all clots and debris.  Uterine edge grasped with Cary clamps.  It was   repaired with #1 chromic suture in running locked fashion.  Hemostasis was   noted.  Gutters cleared bilaterally of all clots and debris.  Uterine incision   irrigated and noted to be hemostatic.  The lower blade removed.  Rectus muscle   reapproximated with 2-0 chromic suture.  Fascia reapproximated with #1 Vicryl   suture in a running fashion.  Subcu irrigated and reapproximated with 3-0 plain   gut suture.  Skin reapproximated with 3-0 Monocryl suture in subcuticular   fashion followed by Dermabond glue.  Sponge, lap, instrument, and needle counts   correct x2.  The patient was cleaned, brought to recovery room awake and in   stable condition.        ______________________________  MD NILESH Law/JENELLE  DD:  03/18/2024  Time:  06:30PM  DT:  03/19/2024  Time:  12:29AM  Job #:  [id number]      OPERATIVE REPORT

## 2024-03-19 NOTE — ANESTHESIA POSTPROCEDURE EVALUATION
Anesthesia Post Evaluation    Patient: Leatha ChiazSusy    Procedure(s) Performed: Procedure(s) (LRB):   SECTION (N/A)    Final Anesthesia Type: epidural      Patient location during evaluation: labor & delivery  Patient participation: Yes- Able to Participate  Post-procedure vital signs: reviewed and stable (No complaints at this time)  Pain management: adequate      Anesthetic complications: no              No complaints at this time.        Vitals Value Taken Time   /58 24 1905   Temp 36.8 °C (98.3 °F) 24 190   Pulse 60 24 1905   Resp 17 24 190   SpO2 100 % 24         Event Time   Out of Recovery 19:35:00         Pain/Mounika Score: Mounika Score: 9 (3/18/2024  7:05 PM)

## 2024-03-19 NOTE — ANESTHESIA POSTPROCEDURE EVALUATION
Anesthesia Post Evaluation    Patient: Leatha Irizarry [facility]    Procedure(s) Performed: * No procedures listed *    Final Anesthesia Type: spinal      Patient location during evaluation: floor  Patient participation: Yes- Able to Participate  Level of consciousness: awake and alert and oriented  Post-procedure vital signs: reviewed and stable  Pain management: adequate  Airway patency: patent    PONV status at discharge: No PONV  Anesthetic complications: no      Cardiovascular status: blood pressure returned to baseline  Respiratory status: unassisted, spontaneous ventilation and room air  Hydration status: euvolemic  Follow-up not needed.  Comments: Denies headache, mod-severe backpain, or lower extremity motor weekness              Vitals Value Taken Time   /72 03/19/24 0821   Temp 36.9 °C (98.4 °F) 03/19/24 0821   Pulse 78 03/19/24 0821   Resp 18 03/19/24 0818   SpO2 100 % 03/18/24 1935         No case tracking events are documented in the log.      Pain/Mounika Score: Pain Rating Prior to Med Admin: 7 (3/19/2024  8:18 AM)  Pain Rating Post Med Admin: 1 (3/19/2024  9:20 AM)  Mounika Score: 10 (3/19/2024  8:20 AM)

## 2024-03-19 NOTE — PROGRESS NOTES
" Delivery Progress Note  Obstetrics        SUBJECTIVE:     Postpartum Day 1:  Delivery    Ms. Infante states she feels well. She denies emotional concerns. Her pain is well controlled with current medications. The baby is well. The baby is feeding via breast. The patient is ambulating well. Ms. Infante is tolerating a normal diet. Flatus has been passed. Urinary output is adequate.    OBJECTIVE:     Vital Signs (Most Recent):  Temp: 98.8 °F (37.1 °C) (24 042)  Pulse: 71 (24)  Resp: 16 (24 0014)  BP: 111/76 (24)  SpO2: 100 % (24)    Vital Signs Range (Last 24H):  Temp:  [97.6 °F (36.4 °C)-98.8 °F (37.1 °C)]   Pulse:  [52-98]   Resp:  [16-18]   BP: ()/(58-81)   SpO2:  [95 %-100 %]     I & O (Last 24H):  Intake/Output Summary (Last 24 hours) at 3/19/2024 0752  Last data filed at 3/19/2024 0400  Gross per 24 hour   Intake 100 ml   Output 1815 ml   Net -1715 ml     Physical Exam:  General:    no distress   Lungs:     Heart:     Breasts:     Abdomen:  soft, non-tender; bowel sounds normal   Fundus:  firm   Incision:  healing well   Lochia:   scant rubra   DVT Evaluation:  No evidence of DVT on either side seen on physical exam.     Hemoglobin/Hematocrit  Recent Labs   Lab 24  0537   HGB 11.2*   HCT 34.9*     ABO/Rh  Lab Results   Component Value Date    GROUPTRH O POS 2024     Rubella  No results for input(s): "RUBELLAIGGSC" in the last 168 hours.    ASSESSMENT/PLAN:     Status post  section. Doing well postoperatively.     Continue current care.  "

## 2024-03-20 PROCEDURE — 11000001 HC ACUTE MED/SURG PRIVATE ROOM

## 2024-03-20 PROCEDURE — 25000003 PHARM REV CODE 250: Performed by: OBSTETRICS & GYNECOLOGY

## 2024-03-20 RX ADMIN — IBUPROFEN 800 MG: 800 TABLET, FILM COATED ORAL at 10:03

## 2024-03-20 RX ADMIN — DOCUSATE SODIUM 200 MG: 100 CAPSULE, LIQUID FILLED ORAL at 08:03

## 2024-03-20 RX ADMIN — IBUPROFEN 800 MG: 800 TABLET, FILM COATED ORAL at 02:03

## 2024-03-20 RX ADMIN — OXYCODONE AND ACETAMINOPHEN 1 TABLET: 10; 325 TABLET ORAL at 08:03

## 2024-03-20 RX ADMIN — DOCUSATE SODIUM 200 MG: 100 CAPSULE, LIQUID FILLED ORAL at 10:03

## 2024-03-20 RX ADMIN — PRENATAL VITAMINS-IRON FUMARATE 27 MG IRON-FOLIC ACID 0.8 MG TABLET 1 TABLET: at 10:03

## 2024-03-20 RX ADMIN — OXYCODONE AND ACETAMINOPHEN 1 TABLET: 10; 325 TABLET ORAL at 04:03

## 2024-03-20 RX ADMIN — OXYCODONE AND ACETAMINOPHEN 1 TABLET: 10; 325 TABLET ORAL at 12:03

## 2024-03-20 RX ADMIN — IBUPROFEN 800 MG: 800 TABLET, FILM COATED ORAL at 05:03

## 2024-03-20 NOTE — PROGRESS NOTES
Pt seen this am eating breakfast she has no complaints  Vss afebrile  Aao3  Abd nt nd  Ext nt    Sp rcs pod 1.5 doing well  Dc home tomorrow am

## 2024-03-21 VITALS
HEIGHT: 61 IN | DIASTOLIC BLOOD PRESSURE: 58 MMHG | OXYGEN SATURATION: 100 % | TEMPERATURE: 98 F | WEIGHT: 160 LBS | RESPIRATION RATE: 16 BRPM | HEART RATE: 64 BPM | SYSTOLIC BLOOD PRESSURE: 99 MMHG | BODY MASS INDEX: 30.21 KG/M2

## 2024-03-21 PROBLEM — R10.9 ABDOMINAL PAIN AFFECTING PREGNANCY: Status: ACTIVE | Noted: 2024-03-21

## 2024-03-21 PROBLEM — O26.899 ABDOMINAL PAIN AFFECTING PREGNANCY: Status: ACTIVE | Noted: 2024-03-21

## 2024-03-21 PROCEDURE — 25000003 PHARM REV CODE 250: Performed by: OBSTETRICS & GYNECOLOGY

## 2024-03-21 RX ORDER — OXYCODONE AND ACETAMINOPHEN 5; 325 MG/1; MG/1
1 TABLET ORAL EVERY 8 HOURS PRN
Qty: 24 TABLET | Refills: 0 | Status: SHIPPED | OUTPATIENT
Start: 2024-03-21

## 2024-03-21 RX ORDER — IBUPROFEN 600 MG/1
600 TABLET ORAL EVERY 6 HOURS PRN
Status: CANCELLED | OUTPATIENT
Start: 2024-03-21

## 2024-03-21 RX ORDER — OXYCODONE AND ACETAMINOPHEN 10; 325 MG/1; MG/1
1 TABLET ORAL EVERY 8 HOURS PRN
Qty: 24 TABLET | Refills: 0 | Status: CANCELLED | OUTPATIENT
Start: 2024-03-21

## 2024-03-21 RX ORDER — OXYCODONE AND ACETAMINOPHEN 5; 325 MG/1; MG/1
1 TABLET ORAL EVERY 6 HOURS PRN
Status: DISCONTINUED | OUTPATIENT
Start: 2024-03-21 | End: 2024-03-21 | Stop reason: HOSPADM

## 2024-03-21 RX ADMIN — DOCUSATE SODIUM 200 MG: 100 CAPSULE, LIQUID FILLED ORAL at 10:03

## 2024-03-21 RX ADMIN — OXYCODONE AND ACETAMINOPHEN 1 TABLET: 10; 325 TABLET ORAL at 02:03

## 2024-03-21 RX ADMIN — OXYCODONE HYDROCHLORIDE AND ACETAMINOPHEN 1 TABLET: 5; 325 TABLET ORAL at 10:03

## 2024-03-21 RX ADMIN — PRENATAL VITAMINS-IRON FUMARATE 27 MG IRON-FOLIC ACID 0.8 MG TABLET 1 TABLET: at 10:03

## 2024-03-21 RX ADMIN — IBUPROFEN 800 MG: 800 TABLET, FILM COATED ORAL at 05:03

## 2024-03-27 NOTE — DISCHARGE SUMMARY
"[facility] - 2nd Floor Mother/Baby Unit  Obstetrics  Discharge Summary      Patient Name: Leatha Infante  MRN: [id number]  Admission Date: 3/18/2024  Hospital Length of Stay: 3 days  Discharge Date and Time: 3/21/2024  1:58 PM  Attending Physician: Candida att. providers found   Discharging Provider: Aldo Fleming MD  Primary Care Provider: No, Primary Doctor    HPI: failed tlac rcs    Procedure(s) (LRB):   SECTION (N/A)     Hospital Course: gr8        Final Active Diagnoses:    Diagnosis Date Noted POA    PRINCIPAL PROBLEM:  Abdominal pain affecting pregnancy [O26.899, R10.9] 2024 Unknown      Problems Resolved During this Admission:        Labs: All labs within the past 24 hours have been reviewed    Feeding Method: both breast and bottle    Immunizations       Date Immunization Status Dose Route/Site Given by    24 0700 MMR Deferred 0.5 mL Subcutaneous/ Veronika Morocho RN    24 0800 Tdap Deferred 0.5 mL Intramuscular/ Veronika Morocho RN            Delivery:    Episiotomy:     Lacerations:     Repair suture:     Repair # of packets:     Blood loss (ml):       Birth information:  YOB: 2022   Time of birth: 6:01 PM   Sex: female   Delivery type: , Low Transverse   Gestational Age: 38w6d     Measurements    Weight: 3260 g  Weight (lbs): 7 lb 3 oz  Length: 50.8 cm  Length (in): 20"  Head circumference: 33.1 cm         Delivery Clinician: Delivery Providers    Delivering clinician: Aldo Fleming MD   Provider Role    Wellington Fleming Jr., MD Assisting Surgeon    Selena Coombs RN Circulator    Sharon Rose ST Technician    Sadi Bernard CRNA Nurse Anesthetist    Israel Medrano MD Anesthesiologist    Francesca Martínez RN Registered Nurse    Nicole Donovan LPN Pediatric Nursery    Adrianna Roberson, RRT Respiratory Therapist             Additional  information:  Forceps:    Vacuum:    Breech:    Observed anomalies  "     Living?:     Apgars    Living status: Living  Apgar Component Scores:  1 min.:  5 min.:  10 min.:  15 min.:  20 min.:    Skin color:  0  1       Heart rate:  2  2       Reflex irritability:  2  2       Muscle tone:  2  2       Respiratory effort:  2  2       Total:  8  9       Apgars assigned by: WESLEY AHMADI LPN         Placenta: Delivered:       appearance  Pending Diagnostic Studies:       None            Discharged Condition: stable    Disposition: Home or Self Care    Follow Up:   Follow-up Information       Aldo Fleming MD Follow up.    Specialty: Obstetrics and Gynecology  Why: Call Dr. Fleming office to schedule followup appointment 1-2 weeks from discharge!  Contact information:  [address] [facility] [address] [phone]                           Patient Instructions:   No discharge procedures on file.  Medications:  Discharge Medication List as of 3/21/2024  1:35 PM        START taking these medications    Details   oxyCODONE-acetaminophen (PERCOCET) 5-325 mg per tablet Take 1 tablet by mouth every 8 (eight) hours as needed for Pain., Starting u 3/21/2024, Print           CONTINUE these medications which have NOT CHANGED    Details   aspirin 81 MG Chew Take 81 mg by mouth once daily., Historical Med      ferrous sulfate 325 (65 FE) MG EC tablet Take 1 tablet (325 mg total) by mouth once daily., Starting Tue 2024, Normal      prenatal vit/iron fum/folic ac (PRENATAL 1+1 ORAL) Take 1 tablet by mouth Daily., Historical Med             Aldo Fleming MD  Obstetrics  [facility] - 2nd Floor Mother/Baby Unit

## 2024-10-29 PROCEDURE — 87624 HPV HI-RISK TYP POOLED RSLT: CPT | Performed by: OBSTETRICS & GYNECOLOGY

## 2024-10-29 PROCEDURE — 87591 N.GONORRHOEAE DNA AMP PROB: CPT | Performed by: OBSTETRICS & GYNECOLOGY

## 2024-10-29 PROCEDURE — 87491 CHLMYD TRACH DNA AMP PROBE: CPT | Performed by: OBSTETRICS & GYNECOLOGY

## 2024-10-29 PROCEDURE — 87661 TRICHOMONAS VAGINALIS AMPLIF: CPT | Performed by: OBSTETRICS & GYNECOLOGY

## (undated) DEVICE — SUT 1 36IN CHROMIC GUT CTX

## (undated) DEVICE — SEE MEDLINE ITEM 156931

## (undated) DEVICE — SUT MONOCRYL 4-0 PS-1 UND

## (undated) DEVICE — SET HOTLINE 3 FLUID/BLD WARM

## (undated) DEVICE — DRSNG TELFA ADH ISLAND 4X4IN

## (undated) DEVICE — SUT PLAIN MONO 2-0 XLH 27IN

## (undated) DEVICE — SEE MEDLINE ITEM 157117

## (undated) DEVICE — BULB SYRINGE EAR IRRIGATION

## (undated) DEVICE — SUT 3/0 36IN COATED VICRYL

## (undated) DEVICE — SYS CLSR DERMABOND PRINEO 22CM

## (undated) DEVICE — SOL NACL IRR 1000ML BTL

## (undated) DEVICE — PAD UNDERPAD 30X30

## (undated) DEVICE — CAP BABY BEANIE

## (undated) DEVICE — TRAY CATH FOL SIL URIMTR 16FR

## (undated) DEVICE — PAD SANITARY OB STERILE

## (undated) DEVICE — SOL WATER STRL IRR 1000ML

## (undated) DEVICE — STAPLER SKIN SUBCUTICULAR

## (undated) DEVICE — SUT CHROMIC GUT 2-0 CT-1 27IN

## (undated) DEVICE — BINDER ABDOM 4PANEL 12IN LG/XL

## (undated) DEVICE — SUT MONOCRYL 3-0 KS UND MON

## (undated) DEVICE — SUT CTD VICRYL 1 VIL BR CTX

## (undated) DEVICE — ELECTRODE REM PLYHSV RETURN 9